# Patient Record
Sex: MALE | Race: WHITE | Employment: FULL TIME | URBAN - METROPOLITAN AREA
[De-identification: names, ages, dates, MRNs, and addresses within clinical notes are randomized per-mention and may not be internally consistent; named-entity substitution may affect disease eponyms.]

---

## 2020-01-24 ENCOUNTER — APPOINTMENT (OUTPATIENT)
Dept: CT IMAGING | Age: 45
DRG: 312 | End: 2020-01-24

## 2020-01-24 ENCOUNTER — HOSPITAL ENCOUNTER (INPATIENT)
Age: 45
LOS: 2 days | Discharge: LEFT BEFORE DISCHARGE | DRG: 312 | End: 2020-01-26
Attending: EMERGENCY MEDICINE | Admitting: INTERNAL MEDICINE

## 2020-01-24 ENCOUNTER — APPOINTMENT (OUTPATIENT)
Dept: GENERAL RADIOLOGY | Age: 45
DRG: 312 | End: 2020-01-24

## 2020-01-24 PROBLEM — R55 SYNCOPE AND COLLAPSE: Status: ACTIVE | Noted: 2020-01-24

## 2020-01-24 LAB
ABSOLUTE EOS #: 0.2 K/UL (ref 0–0.4)
ABSOLUTE IMMATURE GRANULOCYTE: ABNORMAL K/UL (ref 0–0.3)
ABSOLUTE LYMPH #: 2.1 K/UL (ref 1–4.8)
ABSOLUTE MONO #: 0.8 K/UL (ref 0.1–1.3)
ANION GAP SERPL CALCULATED.3IONS-SCNC: 12 MMOL/L (ref 9–17)
BASOPHILS # BLD: 1 % (ref 0–2)
BASOPHILS ABSOLUTE: 0.1 K/UL (ref 0–0.2)
BUN BLDV-MCNC: 17 MG/DL (ref 6–20)
BUN/CREAT BLD: ABNORMAL (ref 9–20)
CALCIUM SERPL-MCNC: 9.4 MG/DL (ref 8.6–10.4)
CHLORIDE BLD-SCNC: 105 MMOL/L (ref 98–107)
CHOLESTEROL/HDL RATIO: 3.8
CHOLESTEROL: 165 MG/DL
CO2: 23 MMOL/L (ref 20–31)
CREAT SERPL-MCNC: 0.65 MG/DL (ref 0.7–1.2)
D-DIMER QUANTITATIVE: <0.27 MG/L FEU (ref 0–0.59)
DIFFERENTIAL TYPE: ABNORMAL
EOSINOPHILS RELATIVE PERCENT: 1 % (ref 0–4)
GFR AFRICAN AMERICAN: >60 ML/MIN
GFR NON-AFRICAN AMERICAN: >60 ML/MIN
GFR SERPL CREATININE-BSD FRML MDRD: ABNORMAL ML/MIN/{1.73_M2}
GFR SERPL CREATININE-BSD FRML MDRD: ABNORMAL ML/MIN/{1.73_M2}
GLUCOSE BLD-MCNC: 120 MG/DL (ref 70–99)
HCT VFR BLD CALC: 41.8 % (ref 41–53)
HDLC SERPL-MCNC: 43 MG/DL
HEMOGLOBIN: 13.8 G/DL (ref 13.5–17.5)
IMMATURE GRANULOCYTES: ABNORMAL %
LDL CHOLESTEROL: 98 MG/DL (ref 0–130)
LYMPHOCYTES # BLD: 17 % (ref 24–44)
MAGNESIUM: 2.1 MG/DL (ref 1.6–2.6)
MCH RBC QN AUTO: 29.6 PG (ref 26–34)
MCHC RBC AUTO-ENTMCNC: 33.1 G/DL (ref 31–37)
MCV RBC AUTO: 89.6 FL (ref 80–100)
MONOCYTES # BLD: 6 % (ref 1–7)
MYOGLOBIN: <21 NG/ML (ref 28–72)
MYOGLOBIN: <21 NG/ML (ref 28–72)
NRBC AUTOMATED: ABNORMAL PER 100 WBC
PDW BLD-RTO: 13.9 % (ref 11.5–14.9)
PLATELET # BLD: 259 K/UL (ref 150–450)
PLATELET ESTIMATE: ABNORMAL
PMV BLD AUTO: 9.1 FL (ref 6–12)
POTASSIUM SERPL-SCNC: 3.8 MMOL/L (ref 3.7–5.3)
RBC # BLD: 4.66 M/UL (ref 4.5–5.9)
RBC # BLD: ABNORMAL 10*6/UL
SEG NEUTROPHILS: 75 % (ref 36–66)
SEGMENTED NEUTROPHILS ABSOLUTE COUNT: 9.3 K/UL (ref 1.3–9.1)
SODIUM BLD-SCNC: 140 MMOL/L (ref 135–144)
THYROXINE, FREE: 1 NG/DL (ref 0.93–1.7)
TRIGL SERPL-MCNC: 119 MG/DL
TROPONIN INTERP: ABNORMAL
TROPONIN INTERP: ABNORMAL
TROPONIN T: ABNORMAL NG/ML
TROPONIN T: ABNORMAL NG/ML
TROPONIN, HIGH SENSITIVITY: <6 NG/L (ref 0–22)
TROPONIN, HIGH SENSITIVITY: <6 NG/L (ref 0–22)
TSH SERPL DL<=0.05 MIU/L-ACNC: 0.77 MIU/L (ref 0.3–5)
VLDLC SERPL CALC-MCNC: NORMAL MG/DL (ref 1–30)
WBC # BLD: 12.4 K/UL (ref 3.5–11)
WBC # BLD: ABNORMAL 10*3/UL

## 2020-01-24 PROCEDURE — 93005 ELECTROCARDIOGRAM TRACING: CPT | Performed by: EMERGENCY MEDICINE

## 2020-01-24 PROCEDURE — 6370000000 HC RX 637 (ALT 250 FOR IP): Performed by: INTERNAL MEDICINE

## 2020-01-24 PROCEDURE — 85379 FIBRIN DEGRADATION QUANT: CPT

## 2020-01-24 PROCEDURE — 86780 TREPONEMA PALLIDUM: CPT

## 2020-01-24 PROCEDURE — 84484 ASSAY OF TROPONIN QUANT: CPT

## 2020-01-24 PROCEDURE — 6370000000 HC RX 637 (ALT 250 FOR IP): Performed by: STUDENT IN AN ORGANIZED HEALTH CARE EDUCATION/TRAINING PROGRAM

## 2020-01-24 PROCEDURE — 83735 ASSAY OF MAGNESIUM: CPT

## 2020-01-24 PROCEDURE — 2580000003 HC RX 258: Performed by: PSYCHIATRY & NEUROLOGY

## 2020-01-24 PROCEDURE — 70450 CT HEAD/BRAIN W/O DYE: CPT

## 2020-01-24 PROCEDURE — 83874 ASSAY OF MYOGLOBIN: CPT

## 2020-01-24 PROCEDURE — 70498 CT ANGIOGRAPHY NECK: CPT

## 2020-01-24 PROCEDURE — 71046 X-RAY EXAM CHEST 2 VIEWS: CPT

## 2020-01-24 PROCEDURE — 2580000003 HC RX 258: Performed by: STUDENT IN AN ORGANIZED HEALTH CARE EDUCATION/TRAINING PROGRAM

## 2020-01-24 PROCEDURE — 36415 COLL VENOUS BLD VENIPUNCTURE: CPT

## 2020-01-24 PROCEDURE — 99285 EMERGENCY DEPT VISIT HI MDM: CPT

## 2020-01-24 PROCEDURE — 84439 ASSAY OF FREE THYROXINE: CPT

## 2020-01-24 PROCEDURE — 72125 CT NECK SPINE W/O DYE: CPT

## 2020-01-24 PROCEDURE — 99223 1ST HOSP IP/OBS HIGH 75: CPT | Performed by: INTERNAL MEDICINE

## 2020-01-24 PROCEDURE — 6360000004 HC RX CONTRAST MEDICATION: Performed by: PSYCHIATRY & NEUROLOGY

## 2020-01-24 PROCEDURE — 2060000000 HC ICU INTERMEDIATE R&B

## 2020-01-24 PROCEDURE — 80048 BASIC METABOLIC PNL TOTAL CA: CPT

## 2020-01-24 PROCEDURE — 85025 COMPLETE CBC W/AUTO DIFF WBC: CPT

## 2020-01-24 PROCEDURE — 84443 ASSAY THYROID STIM HORMONE: CPT

## 2020-01-24 PROCEDURE — 80061 LIPID PANEL: CPT

## 2020-01-24 RX ORDER — MIRTAZAPINE 30 MG/1
30 TABLET, FILM COATED ORAL NIGHTLY
Status: DISCONTINUED | OUTPATIENT
Start: 2020-01-24 | End: 2020-01-27 | Stop reason: HOSPADM

## 2020-01-24 RX ORDER — ACETAMINOPHEN 325 MG/1
650 TABLET ORAL EVERY 4 HOURS PRN
Status: DISCONTINUED | OUTPATIENT
Start: 2020-01-24 | End: 2020-01-27 | Stop reason: HOSPADM

## 2020-01-24 RX ORDER — POTASSIUM CHLORIDE 20 MEQ/1
40 TABLET, EXTENDED RELEASE ORAL PRN
Status: DISCONTINUED | OUTPATIENT
Start: 2020-01-24 | End: 2020-01-27 | Stop reason: HOSPADM

## 2020-01-24 RX ORDER — 0.9 % SODIUM CHLORIDE 0.9 %
80 INTRAVENOUS SOLUTION INTRAVENOUS ONCE
Status: COMPLETED | OUTPATIENT
Start: 2020-01-24 | End: 2020-01-24

## 2020-01-24 RX ORDER — MIRTAZAPINE 30 MG/1
30 TABLET, FILM COATED ORAL NIGHTLY
COMMUNITY

## 2020-01-24 RX ORDER — SODIUM CHLORIDE 0.9 % (FLUSH) 0.9 %
10 SYRINGE (ML) INJECTION EVERY 12 HOURS SCHEDULED
Status: DISCONTINUED | OUTPATIENT
Start: 2020-01-24 | End: 2020-01-27 | Stop reason: HOSPADM

## 2020-01-24 RX ORDER — POTASSIUM CHLORIDE 7.45 MG/ML
10 INJECTION INTRAVENOUS PRN
Status: DISCONTINUED | OUTPATIENT
Start: 2020-01-24 | End: 2020-01-27 | Stop reason: HOSPADM

## 2020-01-24 RX ORDER — SODIUM CHLORIDE 0.9 % (FLUSH) 0.9 %
10 SYRINGE (ML) INJECTION PRN
Status: DISCONTINUED | OUTPATIENT
Start: 2020-01-24 | End: 2020-01-27 | Stop reason: HOSPADM

## 2020-01-24 RX ORDER — MAGNESIUM SULFATE 1 G/100ML
1 INJECTION INTRAVENOUS PRN
Status: DISCONTINUED | OUTPATIENT
Start: 2020-01-24 | End: 2020-01-27 | Stop reason: HOSPADM

## 2020-01-24 RX ORDER — ONDANSETRON 2 MG/ML
4 INJECTION INTRAMUSCULAR; INTRAVENOUS EVERY 6 HOURS PRN
Status: DISCONTINUED | OUTPATIENT
Start: 2020-01-24 | End: 2020-01-27 | Stop reason: HOSPADM

## 2020-01-24 RX ORDER — FAMOTIDINE 20 MG/1
20 TABLET, FILM COATED ORAL 2 TIMES DAILY
Status: DISCONTINUED | OUTPATIENT
Start: 2020-01-24 | End: 2020-01-27 | Stop reason: HOSPADM

## 2020-01-24 RX ADMIN — FAMOTIDINE 20 MG: 20 TABLET ORAL at 21:57

## 2020-01-24 RX ADMIN — IOPAMIDOL 75 ML: 755 INJECTION, SOLUTION INTRAVENOUS at 19:41

## 2020-01-24 RX ADMIN — MIRTAZAPINE 30 MG: 30 TABLET, FILM COATED ORAL at 21:57

## 2020-01-24 RX ADMIN — SODIUM CHLORIDE 80 ML: 9 INJECTION, SOLUTION INTRAVENOUS at 19:42

## 2020-01-24 RX ADMIN — Medication 10 ML: at 19:42

## 2020-01-24 RX ADMIN — Medication 10 ML: at 21:57

## 2020-01-24 SDOH — HEALTH STABILITY: MENTAL HEALTH: HOW OFTEN DO YOU HAVE A DRINK CONTAINING ALCOHOL?: NEVER

## 2020-01-24 ASSESSMENT — ENCOUNTER SYMPTOMS
ABDOMINAL PAIN: 0
FACIAL SWELLING: 0
NAUSEA: 0
SORE THROAT: 0
SHORTNESS OF BREATH: 1
EYE PAIN: 0
WHEEZING: 0
CONSTIPATION: 0
SINUS PRESSURE: 0
COLOR CHANGE: 0
COUGH: 0
BLOOD IN STOOL: 0
RHINORRHEA: 0
VOMITING: 0
CHEST TIGHTNESS: 0
BACK PAIN: 0
TROUBLE SWALLOWING: 0
EYE DISCHARGE: 0
SHORTNESS OF BREATH: 0
DIARRHEA: 0
EYE REDNESS: 0

## 2020-01-24 NOTE — H&P
[address]      [address] [facility]     HISTORY AND PHYSICAL EXAMINATION            Date:   1/24/2020  Patient name:  Ibeth Justice  Date of admission:  1/24/2020  3:32 PM  MRN:   [id number]  Account:  [de-identified]  YOB: 1975  PCP:    No primary care provider on file. Room:   [facility]  Code Status:    No Order    Chief Complaint:     Chief Complaint   Patient presents with    Loss of Consciousness       History Obtained From:     patient    History of Present Illness: The patient is a 40 y.o. Non-/non  male who presents withLoss of Consciousness   and he is admitted to the hospital for the management of  Syncope. Patient states that in the past week he has passed out 3 times. Patient states he is not doing anything exertional or stressful at that time. He describes blurred vision before and a strange sensation but has difficulty describing it. He states that his wife did tell him he complained of shortness of breath and palpitations prior to one episode however he does not remember this. He states he is out for a few minutes. He is only confused for a few minutes after each episode. Does not describe any postictal state. Patient denies losing bowel or bladder function during episodes. Denies any chest pain dizziness. Patient does have a past medical history of a TIA approximately 4 years ago and did have a syncopal episode at that time. Patient is also complaining of progressive weakness in his left hand and leg. Patient states this is been ongoing for at least the past month. Patient is a  and states he is no longer comfortable holding pans in his left hand. He is also noticed marked weakness in his leg and now walks with a limp.     Patient has past medical history of kidney failure on dialysis for approximately 6 months currently 10 years ago. States this was precipitated by \"partying too hard \". Patient also has a history of multiple concussions with the most recent one being 6 years ago. Does not take any medications other than an occasional Remeron for sleep. Patient has not followed up with a physician in several years. Past Medical History:     Past Medical History:   Diagnosis Date    Hypertension     Kidney disease     TIA (transient ischemic attack)         Past SurgicalHistory:     No past surgical history on file. Medications Prior to Admission:        Prior to Admission medications    Not on File        Allergies:     Penicillins    Social History:     Tobacco:    reports that he has been smoking cigarettes. He has been smoking about 1.00 pack per day. He has never used smokeless tobacco.  Alcohol:      reports previous alcohol use. Drug Use:  reports no history of drug use. Family History:     No family history on file. Review of Systems:     Positive and Negative as described in HPI. Review of Systems   Constitutional: Negative for chills and fever. HENT: Negative for congestion. Eyes: Positive for visual disturbance. Respiratory: Positive for shortness of breath. Cardiovascular: Positive for palpitations. Gastrointestinal: Negative for nausea and vomiting. Musculoskeletal: Positive for gait problem. Neurological: Positive for weakness and headaches. Psychiatric/Behavioral: Negative for confusion. Physical Exam:   BP (!) 146/94   Pulse 86   Temp 98.2 °F (36.8 °C)   Resp 23   Ht 5' 10\" (1.778 m)   Wt 180 lb (81.6 kg)   SpO2 98%   BMI 25.83 kg/m²   Temp (24hrs), Av.2 °F (36.8 °C), Min:98.2 °F (36.8 °C), Max:98.2 °F (36.8 °C)    No results for input(s): POCGLU in the last 72 hours. No intake or output data in the 24 hours ending 20 2691    Physical Exam  Constitutional:       Appearance: Normal appearance. He is obese.    HENT:      Head: Normocephalic

## 2020-01-24 NOTE — ED PROVIDER NOTES
Negative for color change, pallor, rash and wound. Neurological: Positive for syncope and numbness. Negative for dizziness, tremors, seizures, speech difficulty, weakness and headaches. Psychiatric/Behavioral: Negative for confusion, decreased concentration, hallucinations, self-injury, sleep disturbance and suicidal ideas. PAST MEDICAL HISTORY     Past Medical History:   Diagnosis Date    Hypertension     Kidney disease     TIA (transient ischemic attack)        SURGICAL HISTORY     No past surgical history on file. CURRENT MEDICATIONS       Previous Medications    No medications on file       ALLERGIES     is allergic to penicillins. FAMILY HISTORY     [unfilled]     SOCIAL HISTORY      reports that he has been smoking cigarettes. He has been smoking about 1.00 pack per day. He has never used smokeless tobacco. He reports previous alcohol use. He reports that he does not use drugs. PHYSICAL EXAM     INITIAL VITALS: BP (!) 146/94   Pulse 86   Temp 98.2 °F (36.8 °C)   Resp 23   Ht 5' 10\" (1.778 m)   Wt 180 lb (81.6 kg)   SpO2 98%   BMI 25.83 kg/m²      Physical Exam  Vitals signs and nursing note reviewed. Constitutional:       General: He is not in acute distress. Appearance: He is well-developed. He is not diaphoretic. HENT:      Head: Normocephalic and atraumatic. Eyes:      General: No scleral icterus. Right eye: No discharge. Left eye: No discharge. Conjunctiva/sclera: Conjunctivae normal.      Pupils: Pupils are equal, round, and reactive to light. Cardiovascular:      Rate and Rhythm: Normal rate and regular rhythm. Heart sounds: Normal heart sounds. No murmur. No friction rub. No gallop. Pulmonary:      Effort: Pulmonary effort is normal. No respiratory distress. Breath sounds: Normal breath sounds. No wheezing or rales. Chest:      Chest wall: No tenderness. Abdominal:      General: Bowel sounds are normal. There is no distension. Vw)    Result Date: 1/24/2020  EXAMINATION: TWO XRAY VIEWS OF THE CHEST 1/24/2020 4:02 pm COMPARISON: None. HISTORY: ORDERING SYSTEM PROVIDED HISTORY: syncope TECHNOLOGIST PROVIDED HISTORY: syncope Reason for Exam: Pt passed out this morning. sob Acuity: Acute Type of Exam: Unknown FINDINGS: The cardiomediastinal silhouette is unremarkable. The lungs are clear. No infiltrate, pleural fluid or focal process is identified. No acute osseous findings. No acute cardiopulmonary disease. Ct Head Wo Contrast    Result Date: 1/24/2020  EXAMINATION: CT OF THE HEAD WITHOUT CONTRAST  1/24/2020 4:03 pm TECHNIQUE: CT of the head was performed without the administration of intravenous contrast. Dose modulation, iterative reconstruction, and/or weight based adjustment of the mA/kV was utilized to reduce the radiation dose to as low as reasonably achievable. COMPARISON: None. HISTORY: ORDERING SYSTEM PROVIDED HISTORY: Syncope TECHNOLOGIST PROVIDED HISTORY: Syncope Reason for Exam: patient states he passed out today. denies hitting head FINDINGS: BRAIN/VENTRICLES: There is no acute intracranial hemorrhage, mass effect or midline shift. No abnormal extra-axial fluid collection. The gray-white differentiation is maintained without evidence of an acute infarct. There is no evidence of hydrocephalus. ORBITS: The visualized portion of the orbits demonstrate no acute abnormality. SINUSES: The visualized paranasal sinuses and mastoid air cells demonstrate no acute abnormality. SOFT TISSUES/SKULL:  No acute abnormality of the visualized skull or soft tissues. No acute intracranial abnormality. Ct Cervical Spine Wo Contrast    Result Date: 1/24/2020  EXAMINATION: CT OF THE CERVICAL SPINE WITHOUT CONTRAST 1/24/2020 4:04 pm TECHNIQUE: CT of the cervical spine was performed without the administration of intravenous contrast. Multiplanar reformatted images are provided for review.  Dose modulation, iterative reconstruction, explained to the patient I feel concerned about his syncopal events and they feel that he needs to be admitted. Patient does agree to this. I spoke with Dr. Jeffrey Jackson who agrees to the admission and residents have been notified. CRITICAL CARE:   None    CONSULTS:  IP CONSULT TO PRIMARY CARE PROVIDER    PROCEDURES:  None    FINAL IMPRESSION      1. Syncope and collapse          DISPOSITION/PLAN   DISPOSITION Decision To Admit 01/24/2020 04:42:00 PM      PATIENT REFERRED TO:  No follow-up provider specified.     DISCHARGE MEDICATIONS:  New Prescriptions    No medications on file       (Please note that portions of this note were completed with a voice recognition program.  Efforts were made to edit the dictations but occasionally words are mis-transcribed.)    Meghan Ventura MD  Attending Carrington Dominguez MD  01/24/20 8710

## 2020-01-25 PROBLEM — R53.1 LEFT-SIDED WEAKNESS: Status: ACTIVE | Noted: 2020-01-25

## 2020-01-25 LAB
ABSOLUTE EOS #: 0.2 K/UL (ref 0–0.4)
ABSOLUTE IMMATURE GRANULOCYTE: ABNORMAL K/UL (ref 0–0.3)
ABSOLUTE LYMPH #: 3.3 K/UL (ref 1–4.8)
ABSOLUTE MONO #: 0.8 K/UL (ref 0.1–1.3)
ALBUMIN SERPL-MCNC: 4 G/DL (ref 3.5–5.2)
ALBUMIN/GLOBULIN RATIO: ABNORMAL (ref 1–2.5)
ALP BLD-CCNC: 58 U/L (ref 40–129)
ALT SERPL-CCNC: 19 U/L (ref 5–41)
ANION GAP SERPL CALCULATED.3IONS-SCNC: 9 MMOL/L (ref 9–17)
AST SERPL-CCNC: 13 U/L
BASOPHILS # BLD: 1 % (ref 0–2)
BASOPHILS ABSOLUTE: 0.1 K/UL (ref 0–0.2)
BILIRUB SERPL-MCNC: <0.15 MG/DL (ref 0.3–1.2)
BUN BLDV-MCNC: 14 MG/DL (ref 6–20)
BUN/CREAT BLD: ABNORMAL (ref 9–20)
CALCIUM SERPL-MCNC: 8.8 MG/DL (ref 8.6–10.4)
CHLORIDE BLD-SCNC: 109 MMOL/L (ref 98–107)
CO2: 24 MMOL/L (ref 20–31)
CREAT SERPL-MCNC: 0.69 MG/DL (ref 0.7–1.2)
DIFFERENTIAL TYPE: ABNORMAL
EOSINOPHILS RELATIVE PERCENT: 2 % (ref 0–4)
GFR AFRICAN AMERICAN: >60 ML/MIN
GFR NON-AFRICAN AMERICAN: >60 ML/MIN
GFR SERPL CREATININE-BSD FRML MDRD: ABNORMAL ML/MIN/{1.73_M2}
GFR SERPL CREATININE-BSD FRML MDRD: ABNORMAL ML/MIN/{1.73_M2}
GLUCOSE BLD-MCNC: 105 MG/DL (ref 70–99)
HCT VFR BLD CALC: 41.6 % (ref 41–53)
HEMOGLOBIN: 13.7 G/DL (ref 13.5–17.5)
IMMATURE GRANULOCYTES: ABNORMAL %
LV EF: 55 %
LVEF MODALITY: NORMAL
LYMPHOCYTES # BLD: 34 % (ref 24–44)
MCH RBC QN AUTO: 29.6 PG (ref 26–34)
MCHC RBC AUTO-ENTMCNC: 33 G/DL (ref 31–37)
MCV RBC AUTO: 89.6 FL (ref 80–100)
MONOCYTES # BLD: 8 % (ref 1–7)
NRBC AUTOMATED: ABNORMAL PER 100 WBC
PDW BLD-RTO: 14.1 % (ref 11.5–14.9)
PLATELET # BLD: 258 K/UL (ref 150–450)
PLATELET ESTIMATE: ABNORMAL
PMV BLD AUTO: 8.8 FL (ref 6–12)
POTASSIUM SERPL-SCNC: 3.9 MMOL/L (ref 3.7–5.3)
RBC # BLD: 4.64 M/UL (ref 4.5–5.9)
RBC # BLD: ABNORMAL 10*6/UL
SEG NEUTROPHILS: 55 % (ref 36–66)
SEGMENTED NEUTROPHILS ABSOLUTE COUNT: 5.3 K/UL (ref 1.3–9.1)
SODIUM BLD-SCNC: 142 MMOL/L (ref 135–144)
T. PALLIDUM, IGG: NONREACTIVE
TOTAL PROTEIN: 6.7 G/DL (ref 6.4–8.3)
WBC # BLD: 9.7 K/UL (ref 3.5–11)
WBC # BLD: ABNORMAL 10*3/UL

## 2020-01-25 PROCEDURE — 2580000003 HC RX 258: Performed by: STUDENT IN AN ORGANIZED HEALTH CARE EDUCATION/TRAINING PROGRAM

## 2020-01-25 PROCEDURE — 93306 TTE W/DOPPLER COMPLETE: CPT

## 2020-01-25 PROCEDURE — 36415 COLL VENOUS BLD VENIPUNCTURE: CPT

## 2020-01-25 PROCEDURE — 99254 IP/OBS CNSLTJ NEW/EST MOD 60: CPT | Performed by: PSYCHIATRY & NEUROLOGY

## 2020-01-25 PROCEDURE — 80053 COMPREHEN METABOLIC PANEL: CPT

## 2020-01-25 PROCEDURE — 99233 SBSQ HOSP IP/OBS HIGH 50: CPT | Performed by: INTERNAL MEDICINE

## 2020-01-25 PROCEDURE — 2060000000 HC ICU INTERMEDIATE R&B

## 2020-01-25 PROCEDURE — 85025 COMPLETE CBC W/AUTO DIFF WBC: CPT

## 2020-01-25 PROCEDURE — 6370000000 HC RX 637 (ALT 250 FOR IP): Performed by: INTERNAL MEDICINE

## 2020-01-25 RX ADMIN — MIRTAZAPINE 30 MG: 30 TABLET, FILM COATED ORAL at 20:12

## 2020-01-25 RX ADMIN — Medication 10 ML: at 08:26

## 2020-01-25 RX ADMIN — Medication 10 ML: at 20:13

## 2020-01-25 ASSESSMENT — ENCOUNTER SYMPTOMS
ABDOMINAL DISTENTION: 0
CHEST TIGHTNESS: 0
BACK PAIN: 0
APNEA: 0
ABDOMINAL PAIN: 0
SHORTNESS OF BREATH: 0

## 2020-01-25 NOTE — FLOWSHEET NOTE
Writers contacted [address] [place] [address] of Jun 09 [phone] and left a voicemail on the [facility]'s voicemail letting the [facility] know that Chi Jane is in room [facility]95 at [facility]       01/25/20 1340 [place] [employer] Yes
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
## 2020-01-25 NOTE — PLAN OF CARE
Problem: Falls - Risk of:  Goal: Will remain free from falls  Description  Will remain free from falls  Outcome: Ongoing  Note:   Pt remains free from falls this shift. Bed is locked, in lowest position, and call light within reach. Problem: Cardiac:  Goal: Ability to maintain vital signs within normal range will improve  Description  Ability to maintain vital signs within normal range will improve  Outcome: Ongoing  Note:   Pt remains in NSR this shift. No complaints of dizziness. Problem: Physical Regulation:  Goal: Complications related to the disease process, condition or treatment will be avoided or minimized  Description  Complications related to the disease process, condition or treatment will be avoided or minimized  Outcome: Ongoing  Note:   Pt has been ambulating on the floor on own multiple times this shift.

## 2020-01-25 NOTE — CONSULTS
39 yo wm with syncope having had three episodes of syncope over the past three weeks. The first one he was layimg in bed having no aura or warning. The second one he was sitting down watching television developing palpitations along with lightheadedness with downing out of noises then going out . The third prior to admission he was standing cooking in kitchen complaining of dyspnea along with right temple and jaw pain the  going out . He was reportedly out few minutes with mild confusion on coming to . No clonic jerking or bowel or bladder incontinence is noted . He does report to have had syncope one year ago along with 3 months ago . He reports that over the past one month he has been having le ft hand and left leg weakness . He reports intermitent numbness of left had 2nd 3rd , and 4th fingers with left  weakness trouble holding unto pans along with left leg weakness with giveway and dragging . He denies neck or low back pain  . He denies bowel or bladder complaints. There maybe some tingling in left leg . Head CT normal . CTA head and neck normal .CT cervical spine normal.  There is history of possible TIA 3 years ago with dyspnea and trouble speaking along with prior kidney failure on short term hemodialysis that has resolved . Testing Head CT normal . CTA head and neck normal .CT cervical spine normal.       Past Medical History:   Diagnosis Date    Hypertension     Kidney disease     TIA (transient ischemic attack)        No past surgical history on file. No family history on file.     Social History     Socioeconomic History    Marital status: Single     Spouse name: Not on file    Number of children: Not on file    Years of education: Not on file    Highest education level: Not on file   Occupational History    Not on file   Social Needs    Financial resource strain: Not on file    Food insecurity:     Worry: Not on file     Inability: Not on file    Transportation needs:     Medical: Not on file     Non-medical: Not on file   Tobacco Use    Smoking status: Current Every Day Smoker     Packs/day: 1.00     Types: Cigarettes    Smokeless tobacco: Never Used   Substance and Sexual Activity    Alcohol use: Not Currently     Frequency: Never    Drug use: Never    Sexual activity: Not on file   Lifestyle    Physical activity:     Days per week: Not on file     Minutes per session: Not on file    Stress: Not on file   Relationships    Social connections:     Talks on phone: Not on file     Gets together: Not on file     Attends Islam service: Not on file     Active member of club or organization: Not on file     Attends meetings of clubs or organizations: Not on file     Relationship status: Not on file    Intimate partner violence:     Fear of current or ex partner: Not on file     Emotionally abused: Not on file     Physically abused: Not on file     Forced sexual activity: Not on file   Other Topics Concern    Not on file   Social History Narrative    Not on file       Current Facility-Administered Medications   Medication Dose Route Frequency Provider Last Rate Last Dose    perflutren lipid microspheres (DEFINITY) injection 2.2 mg  2 mL Intravenous ONCE PRN Binu Villa MD        sodium chloride flush 0.9 % injection 10 mL  10 mL Intravenous 2 times per day Michael Hooks MD   10 mL at 01/25/20 0826    sodium chloride flush 0.9 % injection 10 mL  10 mL Intravenous PRN Michael Hooks MD        magnesium hydroxide (MILK OF MAGNESIA) 400 MG/5ML suspension 30 mL  30 mL Oral Daily PRN Michael Hooks MD        ondansetron [facility]) injection 4 mg  4 mg Intravenous Q6H PRN Michael Hooks MD        enoxaparin (LOVENOX) injection 40 mg  40 mg Subcutaneous Daily Michael Hooks MD        potassium chloride (KLOR-CON M) extended release tablet 40 mEq  40 mEq Oral PRN Michael Hooks MD        Or    potassium bicarb-citric acid (EFFER-K) effervescent tablet 40 mEq  40 mEq Oral PRN Linda Gates MD        Or    potassium chloride 10 mEq/100 mL IVPB (Peripheral Line)  10 mEq Intravenous PRN Linda Gates MD        magnesium sulfate 1 g in dextrose 5% 100 mL IVPB  1 g Intravenous PRN Linda Gates MD        famotidine (PEPCID) tablet 20 mg  20 mg Oral BID Linda Gates MD   20 mg at 01/24/20 2157    acetaminophen (TYLENOL) tablet 650 mg  650 mg Oral Q4H PRN Linda Gates MD        sodium chloride flush 0.9 % injection 10 mL  10 mL Intravenous PRN Yue Samuels MD   10 mL at 01/24/20 1942    mirtazapine (REMERON) tablet 30 mg  30 mg Oral Nightly Justin Mckeon MD   30 mg at 01/24/20 2157       Allergies   Allergen Reactions    Penicillins      Unknown reaction - reports that he can take amoxicillin without issue       ROS:   Constitutional                  Negative for fever and chills   HEENT                            Negative for ear discharge, ear pain, nosebleed  Eyes                                Negative for photophobia, pain and discharge  Respiratory                      Negative for hemoptysis and sputum  Cardiovascular                Negative for orthopnea, claudication and PND  Gastrointestinal               Negative for abdominal pain, diarrhea, blood in stool  Musculoskeletal               Negative for joint pain, negative for myalgia  Skin                                 Negative for rash or itching  Endo/heme/allergies       Negative for polydipsia, environmental allergy  Psychiatric                       Negative for suicidal ideation. Patient is not anxious    Vitals:    01/25/20 1247   BP: (!) 129/90   Pulse: 85   Resp: 12   Temp: 98.5 °F (36.9 °C)   SpO2: 100%     Admission weight: 180 lb (81.6 kg)    Neurological Examination  Constitutional .General exam well groomed   Head/ Ears /Nose/Throat/external ear . Normal exam  Neck and thyroid . Normal size. No bruits  Respiratory . Breathsounds clear bilaterally  Cardiovascular:

## 2020-01-26 ENCOUNTER — APPOINTMENT (OUTPATIENT)
Dept: MRI IMAGING | Age: 45
DRG: 312 | End: 2020-01-26

## 2020-01-26 VITALS
RESPIRATION RATE: 16 BRPM | DIASTOLIC BLOOD PRESSURE: 90 MMHG | WEIGHT: 180 LBS | TEMPERATURE: 98.6 F | OXYGEN SATURATION: 98 % | HEART RATE: 89 BPM | SYSTOLIC BLOOD PRESSURE: 137 MMHG | HEIGHT: 70 IN | BODY MASS INDEX: 25.77 KG/M2

## 2020-01-26 LAB
ABSOLUTE EOS #: 0.2 K/UL (ref 0–0.4)
ABSOLUTE IMMATURE GRANULOCYTE: ABNORMAL K/UL (ref 0–0.3)
ABSOLUTE LYMPH #: 2.8 K/UL (ref 1–4.8)
ABSOLUTE MONO #: 0.8 K/UL (ref 0.1–1.3)
ALBUMIN SERPL-MCNC: 4.1 G/DL (ref 3.5–5.2)
ALBUMIN/GLOBULIN RATIO: ABNORMAL (ref 1–2.5)
ALP BLD-CCNC: 59 U/L (ref 40–129)
ALT SERPL-CCNC: 17 U/L (ref 5–41)
ANION GAP SERPL CALCULATED.3IONS-SCNC: 8 MMOL/L (ref 9–17)
AST SERPL-CCNC: 12 U/L
BASOPHILS # BLD: 1 % (ref 0–2)
BASOPHILS ABSOLUTE: 0.1 K/UL (ref 0–0.2)
BILIRUB SERPL-MCNC: 0.28 MG/DL (ref 0.3–1.2)
BUN BLDV-MCNC: 15 MG/DL (ref 6–20)
BUN/CREAT BLD: ABNORMAL (ref 9–20)
CALCIUM SERPL-MCNC: 8.9 MG/DL (ref 8.6–10.4)
CHLORIDE BLD-SCNC: 107 MMOL/L (ref 98–107)
CO2: 24 MMOL/L (ref 20–31)
CREAT SERPL-MCNC: 0.82 MG/DL (ref 0.7–1.2)
DIFFERENTIAL TYPE: ABNORMAL
EKG ATRIAL RATE: 83 BPM
EKG P AXIS: 1 DEGREES
EKG P-R INTERVAL: 154 MS
EKG Q-T INTERVAL: 378 MS
EKG QRS DURATION: 86 MS
EKG QTC CALCULATION (BAZETT): 444 MS
EKG R AXIS: 3 DEGREES
EKG T AXIS: 44 DEGREES
EKG VENTRICULAR RATE: 83 BPM
EOSINOPHILS RELATIVE PERCENT: 2 % (ref 0–4)
GFR AFRICAN AMERICAN: >60 ML/MIN
GFR NON-AFRICAN AMERICAN: >60 ML/MIN
GFR SERPL CREATININE-BSD FRML MDRD: ABNORMAL ML/MIN/{1.73_M2}
GFR SERPL CREATININE-BSD FRML MDRD: ABNORMAL ML/MIN/{1.73_M2}
GLUCOSE BLD-MCNC: 94 MG/DL (ref 70–99)
HCT VFR BLD CALC: 43.7 % (ref 41–53)
HEMOGLOBIN: 14.3 G/DL (ref 13.5–17.5)
IMMATURE GRANULOCYTES: ABNORMAL %
LYMPHOCYTES # BLD: 28 % (ref 24–44)
MCH RBC QN AUTO: 29.8 PG (ref 26–34)
MCHC RBC AUTO-ENTMCNC: 32.7 G/DL (ref 31–37)
MCV RBC AUTO: 90.9 FL (ref 80–100)
MONOCYTES # BLD: 8 % (ref 1–7)
NRBC AUTOMATED: ABNORMAL PER 100 WBC
PDW BLD-RTO: 14 % (ref 11.5–14.9)
PLATELET # BLD: 260 K/UL (ref 150–450)
PLATELET ESTIMATE: ABNORMAL
PMV BLD AUTO: 8.9 FL (ref 6–12)
POTASSIUM SERPL-SCNC: 4 MMOL/L (ref 3.7–5.3)
RBC # BLD: 4.81 M/UL (ref 4.5–5.9)
RBC # BLD: ABNORMAL 10*6/UL
SEG NEUTROPHILS: 61 % (ref 36–66)
SEGMENTED NEUTROPHILS ABSOLUTE COUNT: 6.3 K/UL (ref 1.3–9.1)
SODIUM BLD-SCNC: 139 MMOL/L (ref 135–144)
TOTAL PROTEIN: 6.9 G/DL (ref 6.4–8.3)
WBC # BLD: 10.2 K/UL (ref 3.5–11)
WBC # BLD: ABNORMAL 10*3/UL

## 2020-01-26 PROCEDURE — 80053 COMPREHEN METABOLIC PANEL: CPT

## 2020-01-26 PROCEDURE — 99232 SBSQ HOSP IP/OBS MODERATE 35: CPT | Performed by: PSYCHIATRY & NEUROLOGY

## 2020-01-26 PROCEDURE — 70551 MRI BRAIN STEM W/O DYE: CPT

## 2020-01-26 PROCEDURE — 2580000003 HC RX 258: Performed by: STUDENT IN AN ORGANIZED HEALTH CARE EDUCATION/TRAINING PROGRAM

## 2020-01-26 PROCEDURE — 6370000000 HC RX 637 (ALT 250 FOR IP): Performed by: INTERNAL MEDICINE

## 2020-01-26 PROCEDURE — 85025 COMPLETE CBC W/AUTO DIFF WBC: CPT

## 2020-01-26 PROCEDURE — 99239 HOSP IP/OBS DSCHRG MGMT >30: CPT | Performed by: INTERNAL MEDICINE

## 2020-01-26 PROCEDURE — 93010 ELECTROCARDIOGRAM REPORT: CPT | Performed by: INTERNAL MEDICINE

## 2020-01-26 PROCEDURE — 36415 COLL VENOUS BLD VENIPUNCTURE: CPT

## 2020-01-26 RX ORDER — SODIUM CHLORIDE 0.9 % (FLUSH) 0.9 %
10 SYRINGE (ML) INJECTION PRN
Status: DISCONTINUED | OUTPATIENT
Start: 2020-01-26 | End: 2020-01-26

## 2020-01-26 RX ADMIN — Medication 10 ML: at 08:16

## 2020-01-26 RX ADMIN — Medication 10 ML: at 19:55

## 2020-01-26 RX ADMIN — MIRTAZAPINE 30 MG: 30 TABLET, FILM COATED ORAL at 19:54

## 2020-01-26 ASSESSMENT — ENCOUNTER SYMPTOMS
APNEA: 0
ABDOMINAL DISTENTION: 0
SHORTNESS OF BREATH: 0
ABDOMINAL PAIN: 0
CHEST TIGHTNESS: 0
BACK PAIN: 0

## 2020-01-26 NOTE — PLAN OF CARE
Problem: Falls - Risk of:  Goal: Will remain free from falls  Description  Will remain free from falls  1/26/2020 0411 by Manpreet Reina RN  Outcome: Ongoing   Pt. Remained free from falls and any syncopal episodes during the day. Pt educated on letting nurse know if he feels lightheaded or weak. Pt up per self and has has no issues noted during shift. Bed in lowest position. Call light within reach, hourly rounding complete.    Problem: Falls - Risk of:  Goal: Absence of physical injury  Description  Absence of physical injury  Outcome: Ongoing
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
## 2020-01-26 NOTE — CONSULTS
[facility]  In PatientCardiology Consult             Date:   1/26/2020  Patient name: Faustina Jay  Date of admission:  1/24/2020  3:32 PM  MRN:   [id number]  YOB: 1975      Reason for Admission:  Recurrent syncope    CHIEF COMPLAINT:  Passing out     History Obtained From:  Medical record    HISTORY OF PRESENT ILLNESS:      The patient is a 40 y.o gentleman with h/o HTN, CKD and TIA, admitted yesterday for recurrent syncope. He has had three syncopal episodes over the past three weeks, with the most recent episode occurring while standing in his kitchen and experiencing a prodrome of warmth, SOB and neck pain. EKG is WNL and telemetry over the past 24 hr shows no arrhythmia, with 2D echo today showing normal LVEF and chamber sizes, with only trivial amounts of MR and TR. Neurologic evaluation including head CT and head/neck CTA has been unremarkable. Past Medical History:   has a past medical history of Hypertension, Kidney disease, and TIA (transient ischemic attack). Past Surgical History:   has no past surgical history on file. Home Medications:    Prior to Admission medications    Medication Sig Start Date End Date Taking? Authorizing Provider   mirtazapine (REMERON) 30 MG tablet Take 30 mg by mouth nightly   Yes Historical Provider, MD       Allergies:  Penicillins    Social History:   reports that he has been smoking cigarettes. He has been smoking about 1.00 pack per day. He has never used smokeless tobacco. He reports previous alcohol use. He reports that he does not use drugs. Family History:   Positive for early CAD    REVIEW OF SYSTEMS:    · Constitutional: there has been no unanticipated weight loss. There's been No change in energy level, No change in activity level. · Eyes: No visual changes or diplopia. No scleral icterus. · ENT: No Headaches, hearing loss or vertigo. No mouth sores or sore throat.   · Cardiovascular: No problem  · Respiratory: behavior are noted    DATA:    Diagnostics:      EKG: Sinus rhythm, WNL; QTc 0.444     2D ECHO ( 1/25/20)  Normal left ventricle size and function with an estimated EF > 55%. No segmental wall motion abnormalities seen. Mild left ventricular hypertrophy. Normal diastolic filling. Normal right ventricular size and function. Left atrium is normal in size. Right atrium is normal in size. Normal aortic valve structure and function without stenosis or  regurgitation. Normal aortic root dimension. Normal mitral valve structure and function. Mild mitral regurgitation. Normal tricuspid valve structure and function. Insignificant tricuspid  regurgitation, unable to estimate RVSP. The pulmonic valve is normal in structure. No pulmonic insufficiency. IVC normal diameter & inspiratory collapse indicating normal RA filling  pressure . No obvious thrombus, vegetation or shunt seen on present study. No significant pericardial effusion is seen. CBC:   Recent Labs     01/24/20  1550 01/25/20  0521   WBC 12.4* 9.7   HGB 13.8 13.7   HCT 41.8 41.6    258     BMP:   Recent Labs     01/24/20  1550 01/25/20  0521    142   K 3.8 3.9   CO2 23 24   BUN 17 14   CREATININE 0.65* 0.69*   LABGLOM >60 >60   GLUCOSE 120* 105*     BNP: No results for input(s): BNP in the last 72 hours. PT/INR: No results for input(s): PROTIME, INR in the last 72 hours. APTT:No results for input(s): APTT in the last 72 hours. CARDIAC ENZYMES:No results for input(s): CKTOTAL, CKMB, CKMBINDEX, TROPONINI in the last 72 hours. FASTING LIPID PANEL:  Lab Results   Component Value Date    HDL 43 01/24/2020    TRIG 119 01/24/2020     LIVER PROFILE:  Recent Labs     01/25/20  0521   AST 13   ALT 19   LABALBU 4.0         IMPRESSION:    1. Recurrent syncope, with likely vasovagal mechanism  2.  Structurally normal heart    Patient Active Problem List   Diagnosis    Syncope and collapse    Left-sided weakness RECOMMENDATIONS:  1. Tilt table testing Monday 1/27/20 at [facility]. 2. Increase daily fluid intake. Discussed with patient and nursing.     Electronically signed by Fernanda Rodney MD on 1/26/2020 at 2:35 AM.  [facility]

## 2020-01-26 NOTE — FLOWSHEET NOTE
01/26/20 1353   Encounter Summary   Services provided to: Patient   Referral/Consult From: Rounding   Complexity of Encounter Low   Length of Encounter 15 minutes   Spiritual/Gnosticism   Type Ritual   Intervention Anointing   Sacraments   Sacrament of Sick-Anointing Patient declined anointing  (Dell Sandoval 1-26-20)

## 2020-01-27 NOTE — PROGRESS NOTES
Call out to Dr. Andreina Fernández and perfect serve message sent regarding pt leaving AMA. Unable to reach Dr. Andreina Fernández at this time.

## 2020-01-28 NOTE — DISCHARGE SUMMARY
[place] [id number] Internal Medicine    Discharge Summary     Patient ID: Ana Lindsey  :  1975   MRN: [id number]     ACCOUNT:  [de-identified]   Patient's PCP: No primary care provider on file. Admit Date: 2020   Discharge Date: 2020   Length of Stay: 2  Code Status:  Prior  Admitting Physician: Douglas Reed MD  Discharge Physician: Douglas Reed MD     Active Discharge Diagnoses:     Primary Problem  Syncope and collapse      [place] Problems    Diagnosis Date Noted    Left-sided weakness [R53.1] 2020    Syncope and collapse [R55] 2020       Admission Condition:  fair     Discharged Condition: fair    Hospital Stay:     Hospital Course:  Ana Lindsey is a 40 y.o. male who was admitted for the management of   .     , presented with Loss of Consciousness      ,                         Syncope and collapse;                               Principal Problem:    Syncope and collapse  Active Problems:    Left-sided weakness  Resolved Problems:    * No resolved hospital problems. *       Significant therapeutic interventions:     Patient seen and examined at bedside this morning. No acute events overnight, no new complaints. Discussed with nursing staff. Patient's echo showed left ventricular ejection fraction more than 55%. As per cardiology, tilt table test at [facility] tomorrow and increase fluid intake. As per neurology, awaiting MRI results and CTs all unremarkable. We will continue to monitor, patient most likely okay to discharge home today as procedures will be outpatient. Syncope . Left side weakness. The condition is he was seen by cardiology to undergo tilt table on Monday. He does report some lightheadedness when upright  . Cardiac 2 D echo normal LVF , EF > 55 % . Mild LVH  . Patient presents with syncope having had three episodes of syncope over the past three weeks.  The first one he was layimg in Value Ref Range    WBC 12.4 (H) 3.5 - 11.0 k/uL    RBC 4.66 4.5 - 5.9 m/uL    Hemoglobin 13.8 13.5 - 17.5 g/dL    Hematocrit 41.8 41 - 53 %    MCV 89.6 80 - 100 fL    MCH 29.6 26 - 34 pg    MCHC 33.1 31 - 37 g/dL    RDW 13.9 11.5 - 14.9 %    Platelets 548 818 - 153 k/uL    MPV 9.1 6.0 - 12.0 fL    NRBC Automated NOT REPORTED per 100 WBC    Differential Type NOT REPORTED     Seg Neutrophils 75 (H) 36 - 66 %    Lymphocytes 17 (L) 24 - 44 %    Monocytes 6 1 - 7 %    Eosinophils % 1 0 - 4 %    Basophils 1 0 - 2 %    Immature Granulocytes NOT REPORTED 0 %    Segs Absolute 9.30 (H) 1.3 - 9.1 k/uL    Absolute Lymph # 2.10 1.0 - 4.8 k/uL    Absolute Mono # 0.80 0.1 - 1.3 k/uL    Absolute Eos # 0.20 0.0 - 0.4 k/uL    Basophils Absolute 0.10 0.0 - 0.2 k/uL    Absolute Immature Granulocyte NOT REPORTED 0.00 - 0.30 k/uL    WBC Morphology NOT REPORTED     RBC Morphology NOT REPORTED     Platelet Estimate NOT REPORTED    D-Dimer, Quantitative   Result Value Ref Range    D-Dimer, Quant <0.27 0.00 - 0.59 mg/L FEU   Magnesium   Result Value Ref Range    Magnesium 2.1 1.6 - 2.6 mg/dL   TROP/MYOGLOBIN   Result Value Ref Range    Troponin, High Sensitivity <6 0 - 22 ng/L    Troponin T NOT REPORTED <0.03 ng/mL    Troponin Interp NOT REPORTED     Myoglobin <21 (L) 28 - 72 ng/mL   TROP/MYOGLOBIN   Result Value Ref Range    Troponin, High Sensitivity <6 0 - 22 ng/L    Troponin T NOT REPORTED <0.03 ng/mL    Troponin Interp NOT REPORTED     Myoglobin <21 (L) 28 - 72 ng/mL   TSH without Reflex   Result Value Ref Range    TSH 0.77 0.30 - 5.00 mIU/L   T4, Free   Result Value Ref Range    Thyroxine, Free 1.00 0.93 - 1.70 ng/dL   Comprehensive Metabolic Panel w/ Reflex to MG   Result Value Ref Range    Glucose 105 (H) 70 - 99 mg/dL    BUN 14 6 - 20 mg/dL    CREATININE 0.69 (L) 0.70 - 1.20 mg/dL    Bun/Cre Ratio NOT REPORTED 9 - 20    Calcium 8.8 8.6 - 10.4 mg/dL    Sodium 142 135 - 144 mmol/L    Potassium 3.9 3.7 - 5.3 mmol/L    Chloride 109 (H) Potassium 4.0 3.7 - 5.3 mmol/L    Chloride 107 98 - 107 mmol/L    CO2 24 20 - 31 mmol/L    Anion Gap 8 (L) 9 - 17 mmol/L    Alkaline Phosphatase 59 40 - 129 U/L    ALT 17 5 - 41 U/L    AST 12 <40 U/L    Total Bilirubin 0.28 (L) 0.3 - 1.2 mg/dL    Total Protein 6.9 6.4 - 8.3 g/dL    Alb 4.1 3.5 - 5.2 g/dL    Albumin/Globulin Ratio NOT REPORTED 1.0 - 2.5    GFR Non-African American >60 >60 mL/min    GFR African American >60 >60 mL/min    GFR Comment          GFR Staging NOT REPORTED    CBC auto differential   Result Value Ref Range    WBC 10.2 3.5 - 11.0 k/uL    RBC 4.81 4.5 - 5.9 m/uL    Hemoglobin 14.3 13.5 - 17.5 g/dL    Hematocrit 43.7 41 - 53 %    MCV 90.9 80 - 100 fL    MCH 29.8 26 - 34 pg    MCHC 32.7 31 - 37 g/dL    RDW 14.0 11.5 - 14.9 %    Platelets 964 125 - 871 k/uL    MPV 8.9 6.0 - 12.0 fL    NRBC Automated NOT REPORTED per 100 WBC    Differential Type NOT REPORTED     Seg Neutrophils 61 36 - 66 %    Lymphocytes 28 24 - 44 %    Monocytes 8 (H) 1 - 7 %    Eosinophils % 2 0 - 4 %    Basophils 1 0 - 2 %    Immature Granulocytes NOT REPORTED 0 %    Segs Absolute 6.30 1.3 - 9.1 k/uL    Absolute Lymph # 2.80 1.0 - 4.8 k/uL    Absolute Mono # 0.80 0.1 - 1.3 k/uL    Absolute Eos # 0.20 0.0 - 0.4 k/uL    Basophils Absolute 0.10 0.0 - 0.2 k/uL    Absolute Immature Granulocyte NOT REPORTED 0.00 - 0.30 k/uL    WBC Morphology NOT REPORTED     RBC Morphology NOT REPORTED     Platelet Estimate NOT REPORTED    EKG 12 Lead   Result Value Ref Range    Ventricular Rate 83 BPM    Atrial Rate 83 BPM    P-R Interval 154 ms    QRS Duration 86 ms    Q-T Interval 378 ms    QTc Calculation (Bazett) 444 ms    P Axis 1 degrees    R Axis 3 degrees    T Axis 44 degrees        {Radiology:    Xr Chest Standard (2 Vw)    Result Date: 1/24/2020  EXAMINATION: TWO XRAY VIEWS OF THE CHEST 1/24/2020 4:02 pm COMPARISON: None.  HISTORY: ORDERING SYSTEM PROVIDED HISTORY: syncope TECHNOLOGIST PROVIDED HISTORY: syncope Reason for Exam: Pt passed out this morning. sob Acuity: Acute Type of Exam: Unknown FINDINGS: The cardiomediastinal silhouette is unremarkable. The lungs are clear. No infiltrate, pleural fluid or focal process is identified. No acute osseous findings. No acute cardiopulmonary disease. Ct Head Wo Contrast    Result Date: 1/24/2020  EXAMINATION: CT OF THE HEAD WITHOUT CONTRAST  1/24/2020 4:03 pm TECHNIQUE: CT of the head was performed without the administration of intravenous contrast. Dose modulation, iterative reconstruction, and/or weight based adjustment of the mA/kV was utilized to reduce the radiation dose to as low as reasonably achievable. COMPARISON: None. HISTORY: ORDERING SYSTEM PROVIDED HISTORY: Syncope TECHNOLOGIST PROVIDED HISTORY: Syncope Reason for Exam: patient states he passed out today. denies hitting head FINDINGS: BRAIN/VENTRICLES: There is no acute intracranial hemorrhage, mass effect or midline shift. No abnormal extra-axial fluid collection. The gray-white differentiation is maintained without evidence of an acute infarct. There is no evidence of hydrocephalus. ORBITS: The visualized portion of the orbits demonstrate no acute abnormality. SINUSES: The visualized paranasal sinuses and mastoid air cells demonstrate no acute abnormality. SOFT TISSUES/SKULL:  No acute abnormality of the visualized skull or soft tissues. No acute intracranial abnormality. Ct Cervical Spine Wo Contrast    Result Date: 1/24/2020  EXAMINATION: CT OF THE CERVICAL SPINE WITHOUT CONTRAST 1/24/2020 4:04 pm TECHNIQUE: CT of the cervical spine was performed without the administration of intravenous contrast. Multiplanar reformatted images are provided for review. Dose modulation, iterative reconstruction, and/or weight based adjustment of the mA/kV was utilized to reduce the radiation dose to as low as reasonably achievable. COMPARISON: None.  HISTORY: ORDERING SYSTEM PROVIDED HISTORY: Syncope TECHNOLOGIST PROVIDED HISTORY: salivary and thyroid glands. BONES: No acute osseous abnormality. CTA HEAD: ANTERIOR CIRCULATION: No significant stenosis of the intracranial internal carotid, anterior cerebral, or middle cerebral arteries. No aneurysm. POSTERIOR CIRCULATION: No significant stenosis of the vertebral, basilar, or posterior cerebral arteries. No aneurysm. OTHER: No dural venous sinus thrombosis on this non-dedicated study. BRAIN: No mass effect or midline shift. No extra-axial fluid collection. The gray-white differentiation is maintained. No acute abnormality or flow-limiting stenosis of the major arteries of the head and neck. Mri Brain Wo Contrast    Result Date: 1/26/2020  EXAMINATION: MRI OF THE BRAIN WITHOUT CONTRAST  1/26/2020 8:53 pm TECHNIQUE: Multiplanar multisequence MRI of the brain was performed without the administration of intravenous contrast. COMPARISON: None. HISTORY: ORDERING SYSTEM PROVIDED HISTORY: left side weakness TECHNOLOGIST PROVIDED HISTORY: left side weakness Reason for Exam: left sided weakness Additional signs and symptoms: exam ordered as W/WO but patient was unable to complete exam due to back pain FINDINGS: INTRACRANIAL STRUCTURES/VENTRICLES: There is no acute infarct. No mass effect or midline shift. No evidence of an acute intracranial hemorrhage. The ventricles and sulci are normal in size and configuration. The sellar/suprasellar regions appear unremarkable. The normal signal voids within the major intracranial vessels appear maintained. There is mild chronic white matter microvascular ischemic disease characterized by periventricular white matter signal abnormality. ORBITS: The visualized portion of the orbits demonstrate no acute abnormality. SINUSES: The visualized paranasal sinuses and mastoid air cells are well aerated. BONES/SOFT TISSUES: The bone marrow signal intensity appears normal. The soft tissues demonstrate no acute abnormality. No acute infarct.         Consultations: Consults:     Final Specialist Recommendations/Findings:   IP CONSULT TO PRIMARY CARE PROVIDER  IP CONSULT TO CARDIOLOGY  IP CONSULT TO NEUROLOGY  IP CONSULT TO SOCIAL WORK  IP CONSULT TO NEUROLOGY      The patient was seen and examined on day of discharge and this discharge summary is in conjunction with any daily progress note from day of discharge. Discharge plan:     Disposition: Home    Physician Follow Up: With PCP and as specified     Requiring Further Evaluation/Follow Up POST HOSPITALIZATION/Incidental Findings:    Diet: regular     Activity: As tolerated    I  Discharge Medications:      Medication List      ASK your doctor about these medications    mirtazapine 30 MG tablet  Commonly known as:  REMERON              Time spent on discharge planning ;          [] less than 30 minutes . [x]   more  than 30 minutes . Ellectronically signed by   Denton Mak MD      Thank you Dr. Denise Salamanca primary care provider on file. for the opportunity to be involved in this patient's care. Please note that this chart was generated using voice recognition Dragon dictation software. Although every effort was made to ensure the accuracy of this automated transcription, some errors in transcription may have occurred.